# Patient Record
(demographics unavailable — no encounter records)

---

## 2025-03-03 NOTE — PHYSICAL EXAM
[No Acute Distress] : no acute distress [Well Nourished] : well nourished [Normal Oropharynx] : the oropharynx was normal [Normal TMs] : both tympanic membranes were normal [No Accessory Muscle Use] : no accessory muscle use [Clear to Auscultation] : lungs were clear to auscultation bilaterally [Regular Rhythm] : with a regular rhythm [Normal S1, S2] : normal S1 and S2 [No Murmur] : no murmur heard [No Edema] : there was no peripheral edema [Soft] : abdomen soft [Non Tender] : non-tender [Non-distended] : non-distended [No Masses] : no abdominal mass palpated [No HSM] : no HSM [Normal Bowel Sounds] : normal bowel sounds [Normal Supraclavicular Nodes] : no supraclavicular lymphadenopathy [Normal Posterior Cervical Nodes] : no posterior cervical lymphadenopathy [Normal Anterior Cervical Nodes] : no anterior cervical lymphadenopathy [Normal Affect] : the affect was normal [Normal Insight/Judgement] : insight and judgment were intact [de-identified] : +moderate erythema of nasal mucosa

## 2025-03-03 NOTE — HISTORY OF PRESENT ILLNESS
[[email or web address]] : cc: sinus infection   was in [place] for 3 weeks - initially had URI/gastroenteritis - GI symptoms resolved  -has had 4 days of bloody nasal discharge in am with stick mucus occ green-yellow also cough for 3 weeks  +maxillary >frontal sinus pressure  no f/chills did not take anythign otfc